# Patient Record
Sex: MALE | Race: WHITE | NOT HISPANIC OR LATINO | URBAN - METROPOLITAN AREA
[De-identification: names, ages, dates, MRNs, and addresses within clinical notes are randomized per-mention and may not be internally consistent; named-entity substitution may affect disease eponyms.]

---

## 2018-11-21 ENCOUNTER — OUTPATIENT (OUTPATIENT)
Dept: OUTPATIENT SERVICES | Facility: HOSPITAL | Age: 38
LOS: 1 days | End: 2018-11-21
Payer: COMMERCIAL

## 2018-11-21 VITALS
HEART RATE: 71 BPM | SYSTOLIC BLOOD PRESSURE: 125 MMHG | TEMPERATURE: 98 F | WEIGHT: 216.05 LBS | DIASTOLIC BLOOD PRESSURE: 85 MMHG | HEIGHT: 72 IN | RESPIRATION RATE: 16 BRPM | OXYGEN SATURATION: 97 %

## 2018-11-21 DIAGNOSIS — Z01.818 ENCOUNTER FOR OTHER PREPROCEDURAL EXAMINATION: ICD-10-CM

## 2018-11-21 DIAGNOSIS — M25.561 PAIN IN RIGHT KNEE: ICD-10-CM

## 2018-11-21 LAB
HCT VFR BLD CALC: 42.2 % — SIGNIFICANT CHANGE UP (ref 39–50)
HGB BLD-MCNC: 15.1 G/DL — SIGNIFICANT CHANGE UP (ref 13–17)
MCHC RBC-ENTMCNC: 30.7 PG — SIGNIFICANT CHANGE UP (ref 27–34)
MCHC RBC-ENTMCNC: 35.8 GM/DL — SIGNIFICANT CHANGE UP (ref 32–36)
MCV RBC AUTO: 85.8 FL — SIGNIFICANT CHANGE UP (ref 80–100)
NRBC # BLD: 0 /100 WBCS — SIGNIFICANT CHANGE UP (ref 0–0)
PLATELET # BLD AUTO: 245 K/UL — SIGNIFICANT CHANGE UP (ref 150–400)
RBC # BLD: 4.92 M/UL — SIGNIFICANT CHANGE UP (ref 4.2–5.8)
RBC # FLD: 12.3 % — SIGNIFICANT CHANGE UP (ref 10.3–14.5)
WBC # BLD: 4.96 K/UL — SIGNIFICANT CHANGE UP (ref 3.8–10.5)
WBC # FLD AUTO: 4.96 K/UL — SIGNIFICANT CHANGE UP (ref 3.8–10.5)

## 2018-11-21 PROCEDURE — 36415 COLL VENOUS BLD VENIPUNCTURE: CPT

## 2018-11-21 PROCEDURE — G0463: CPT

## 2018-11-21 PROCEDURE — 85027 COMPLETE CBC AUTOMATED: CPT

## 2018-11-21 NOTE — H&P PST ADULT - PMH
Chronic pain of right knee    Rupture of anterior cruciate ligament of right knee, initial encounter

## 2018-11-21 NOTE — H&P PST ADULT - PROBLEM SELECTOR PLAN 1
Right Knee Arthroscopy, Possible Anterior Cruciate Ligament Repair. Right Knee Arthroscopy, Possible Anterior Cruciate Ligament Repair.  Labs. Pre-op and Hibiclens instructions reviewed and given. Avoid NSAIDs and OTC supplements. Verbalized understanding.

## 2018-11-21 NOTE — H&P PST ADULT - NSANTHOSAYNRD_GEN_A_CORE
No. KEATON screening performed.  STOP BANG Legend: 0-2 = LOW Risk; 3-4 = INTERMEDIATE Risk; 5-8 = HIGH Risk

## 2018-11-21 NOTE — H&P PST ADULT - HISTORY OF PRESENT ILLNESS
37 y.o. male c/o Right knee pain for over two years. He denies any h/o trauma. An MRI of the Right knee revealed an ACL tear. He is scheduled for Right Knee Arthroscopy, Possible Anterior Cruciate Ligament Repair. 37 y.o. male c/o Right knee pain and buckling for over two years. He denies any h/o trauma. An MRI of the Right knee revealed partial ACL tear and bursitis. He is scheduled for Right Knee Arthroscopy, Possible Anterior Cruciate Ligament Repair.

## 2018-11-27 ENCOUNTER — TRANSCRIPTION ENCOUNTER (OUTPATIENT)
Age: 38
End: 2018-11-27

## 2018-11-28 ENCOUNTER — RESULT REVIEW (OUTPATIENT)
Age: 38
End: 2018-11-28

## 2018-11-28 ENCOUNTER — OUTPATIENT (OUTPATIENT)
Dept: OUTPATIENT SERVICES | Facility: HOSPITAL | Age: 38
LOS: 1 days | End: 2018-11-28
Payer: COMMERCIAL

## 2018-11-28 VITALS
RESPIRATION RATE: 15 BRPM | DIASTOLIC BLOOD PRESSURE: 66 MMHG | SYSTOLIC BLOOD PRESSURE: 108 MMHG | HEART RATE: 69 BPM | OXYGEN SATURATION: 96 %

## 2018-11-28 VITALS
SYSTOLIC BLOOD PRESSURE: 143 MMHG | OXYGEN SATURATION: 97 % | TEMPERATURE: 98 F | WEIGHT: 210.1 LBS | HEIGHT: 72 IN | DIASTOLIC BLOOD PRESSURE: 95 MMHG | RESPIRATION RATE: 16 BRPM | HEART RATE: 76 BPM

## 2018-11-28 DIAGNOSIS — M25.561 PAIN IN RIGHT KNEE: ICD-10-CM

## 2018-11-28 DIAGNOSIS — Z01.818 ENCOUNTER FOR OTHER PREPROCEDURAL EXAMINATION: ICD-10-CM

## 2018-11-28 PROCEDURE — 88304 TISSUE EXAM BY PATHOLOGIST: CPT

## 2018-11-28 PROCEDURE — 88304 TISSUE EXAM BY PATHOLOGIST: CPT | Mod: 26

## 2018-11-28 PROCEDURE — 29881 ARTHRS KNE SRG MNISECTMY M/L: CPT | Mod: RT

## 2018-11-28 RX ORDER — SODIUM CHLORIDE 9 MG/ML
1000 INJECTION, SOLUTION INTRAVENOUS
Qty: 0 | Refills: 0 | Status: DISCONTINUED | OUTPATIENT
Start: 2018-11-28 | End: 2018-11-28

## 2018-11-28 RX ORDER — METOCLOPRAMIDE HCL 10 MG
10 TABLET ORAL ONCE
Qty: 0 | Refills: 0 | Status: COMPLETED | OUTPATIENT
Start: 2018-11-28 | End: 2018-11-28

## 2018-11-28 RX ORDER — HYDROMORPHONE HYDROCHLORIDE 2 MG/ML
0.5 INJECTION INTRAMUSCULAR; INTRAVENOUS; SUBCUTANEOUS
Qty: 0 | Refills: 0 | Status: DISCONTINUED | OUTPATIENT
Start: 2018-11-28 | End: 2018-11-28

## 2018-11-28 RX ORDER — MORPHINE SULFATE 50 MG/1
4 CAPSULE, EXTENDED RELEASE ORAL ONCE
Qty: 0 | Refills: 0 | Status: DISCONTINUED | OUTPATIENT
Start: 2018-11-28 | End: 2018-11-28

## 2018-11-28 RX ORDER — CEFAZOLIN SODIUM 1 G
2000 VIAL (EA) INJECTION ONCE
Qty: 0 | Refills: 0 | Status: COMPLETED | OUTPATIENT
Start: 2018-11-28 | End: 2018-11-28

## 2018-11-28 RX ORDER — OXYCODONE HYDROCHLORIDE 5 MG/1
5 TABLET ORAL ONCE
Qty: 0 | Refills: 0 | Status: DISCONTINUED | OUTPATIENT
Start: 2018-11-28 | End: 2018-11-28

## 2018-11-28 RX ORDER — OXYCODONE HYDROCHLORIDE 5 MG/1
10 TABLET ORAL ONCE
Qty: 0 | Refills: 0 | Status: DISCONTINUED | OUTPATIENT
Start: 2018-11-28 | End: 2018-11-28

## 2018-11-28 RX ADMIN — Medication 10 MILLIGRAM(S): at 12:16

## 2018-11-28 RX ADMIN — SODIUM CHLORIDE 75 MILLILITER(S): 9 INJECTION, SOLUTION INTRAVENOUS at 08:49

## 2018-11-28 RX ADMIN — SODIUM CHLORIDE 100 MILLILITER(S): 9 INJECTION, SOLUTION INTRAVENOUS at 11:42

## 2018-11-28 NOTE — BRIEF OPERATIVE NOTE - PROCEDURE
<<-----Click on this checkbox to enter Procedure Arthroscopy of knee  11/28/2018    Active  [facility]

## 2018-11-28 NOTE — ASU DISCHARGE PLAN (ADULT/PEDIATRIC). - MEDICATION SUMMARY - MEDICATIONS TO TAKE
I will START or STAY ON the medications listed below when I get home from the hospital:    Percocet 5/325 oral tablet  -- 1 tab(s) by mouth every 6 hours, As Needed for pain  -- Indication: For for pain I will START or STAY ON the medications listed below when I get home from the hospital:    Percocet 5/325 oral tablet  -- 1 tab(s) by mouth every 4 hours x 7 days  -for severe pain MDD:6   -- Caution federal law prohibits the transfer of this drug to any person other  than the person for whom it was prescribed.  May cause drowsiness.  Alcohol may intensify this effect.  Use care when operating dangerous machinery.  This prescription cannot be refilled.  This product contains acetaminophen.  Do not use  with any other product containing acetaminophen to prevent possible liver damage.  Using more of this medication than prescribed may cause serious breathing problems.    -- Indication: For for pain

## 2018-11-28 NOTE — ASU PATIENT PROFILE, ADULT - NS SC CAGE ALCOHOL EYE OPENER
[facility] 63   OPERATIVE REPORT       Name:  Mitchell Ascencio   MR#:  [id number]   :  1972   Account #:  [de-identified]   Date of Adm:  2017       DATE OF SURGERY: 2017. SURGEON: Adam Brown MD    PREOPERATIVE DIAGNOSIS: Abdominal pain. Needs screening   colonoscopy. POSTOPERATIVE DIAGNOSIS: Abdominal pain. Needs screening   colonoscopy. Normal colonoscopy. INDICATIONS: This is a 39-year-old male who presented with   chronic right upper quadrant pain. He also presented with left   lower quadrant pain recently concerning for diverticulitis and   colonoscopy was requested. The patient understood the risks and   benefits and agreed to proceed. FINDINGS: This is a grossly normal colonoscopy. He does have   some liquidy stool with solid particles which could interfere   with mucosal detail. PROCEDURE: After informed consent was obtained, the patient was   brought into the GI suite. Conscious sedation with fentanyl and   Versed was used. The patient was placed in the left decubitus   position. Distal rectal exam was first performed, which was   normal. Then the regular colonoscope was used. This was advanced   all the way into the cecum. The ileocecal valve was clearly   identified. The colon was then carefully examined all the way   out. There was no evidence of polyps, tumor, or evidence of   bleeding. This is an essentially normal colonoscopy. The scope   was then withdrawn. The patient tolerated the procedure well and   was transferred to the recovery room in stable condition. RECOMMENDATION: The patient will continue with his abdominal   pain workup and will see me in the office.         Calvin Rivera MD      BY / Fransisco Armstrong   D:  2017   13:05   T:  2017   11:21   Job #:  [id number]
no

## 2018-11-28 NOTE — ASU DISCHARGE PLAN (ADULT/PEDIATRIC). - NOTIFY
Fever greater than 101/Numbness, color, or temperature change to extremity/Bleeding that does not stop Bleeding that does not stop/Numbness, tingling/Fever greater than 101/Numbness, color, or temperature change to extremity

## 2018-11-29 LAB — SURGICAL PATHOLOGY FINAL REPORT - CH: SIGNIFICANT CHANGE UP

## 2019-02-07 NOTE — H&P PST ADULT - FALLEN IN THE PAST
Scribe Attestation (For Scribes USE Only)... Scribe Attestation (For Scribes USE Only).../Attending Attestation (For Attendings USE Only)... no

## 2019-08-06 PROBLEM — M25.561 PAIN IN RIGHT KNEE: Chronic | Status: ACTIVE | Noted: 2018-11-21

## 2019-08-06 PROBLEM — S83.511A SPRAIN OF ANTERIOR CRUCIATE LIGAMENT OF RIGHT KNEE, INITIAL ENCOUNTER: Chronic | Status: ACTIVE | Noted: 2018-11-21

## 2019-08-12 ENCOUNTER — APPOINTMENT (OUTPATIENT)
Dept: INTERNAL MEDICINE | Facility: CLINIC | Age: 39
End: 2019-08-12
Payer: COMMERCIAL

## 2019-08-12 ENCOUNTER — NON-APPOINTMENT (OUTPATIENT)
Age: 39
End: 2019-08-12

## 2019-08-12 VITALS
RESPIRATION RATE: 16 BRPM | DIASTOLIC BLOOD PRESSURE: 90 MMHG | BODY MASS INDEX: 29.71 KG/M2 | SYSTOLIC BLOOD PRESSURE: 122 MMHG | HEIGHT: 72 IN | TEMPERATURE: 98 F | HEART RATE: 77 BPM | WEIGHT: 219.38 LBS | OXYGEN SATURATION: 98 %

## 2019-08-12 DIAGNOSIS — R42 DIZZINESS AND GIDDINESS: ICD-10-CM

## 2019-08-12 DIAGNOSIS — Z78.9 OTHER SPECIFIED HEALTH STATUS: ICD-10-CM

## 2019-08-12 DIAGNOSIS — Z00.00 ENCOUNTER FOR GENERAL ADULT MEDICAL EXAMINATION W/OUT ABNORMAL FINDINGS: ICD-10-CM

## 2019-08-12 DIAGNOSIS — Z80.8 FAMILY HISTORY OF MALIGNANT NEOPLASM OF OTHER ORGANS OR SYSTEMS: ICD-10-CM

## 2019-08-12 DIAGNOSIS — Z86.59 PERSONAL HISTORY OF OTHER MENTAL AND BEHAVIORAL DISORDERS: ICD-10-CM

## 2019-08-12 DIAGNOSIS — G43.909 MIGRAINE, UNSPECIFIED, NOT INTRACTABLE, W/OUT STATUS MIGRAINOSUS: ICD-10-CM

## 2019-08-12 PROCEDURE — 99386 PREV VISIT NEW AGE 40-64: CPT | Mod: 25

## 2019-08-12 PROCEDURE — 93000 ELECTROCARDIOGRAM COMPLETE: CPT

## 2019-08-12 PROCEDURE — 36415 COLL VENOUS BLD VENIPUNCTURE: CPT

## 2019-08-12 NOTE — ADDENDUM
[FreeTextEntry1] : I, Toño Crenshaw, acted solely as scribe for Dr. Alexei Sebastian DO on this date 08/12/2019  1:50PM .\par \par All medical record entries made by the Scribe were at my, Dr. Alexei Sebastian DO direction and personally dictated by me on 08/12/2019  1:50PM. I have reviewed the chart and agree that the record accurately reflects my personal performance of the history, physical exam, assessment and plan. I have also personally directed, reviewed and agreed with the chart.\par

## 2019-08-12 NOTE — ASSESSMENT
[FreeTextEntry1] : New patient is a 38 year old male with a past medical history as above who presents for annual physical exam.\par

## 2019-08-12 NOTE — HISTORY OF PRESENT ILLNESS
[FreeTextEntry1] : new patient annual physical exam\par  [de-identified] : New patient is a 38 year old male with a past medical history as below who presents for annual physical exam. Patient is not currently taking any prescription medications or vitamins/supplements. Patient notes history of depression and being on Zoloft in the past. He states the medication did not help. He is not currently interested in initiating treatment. Patient notes intermittent dizziness that started 1 week. He states he felt as though the room were spinning. Patient states the dizziness lasts 2-3 minutes and notes it does not worsen with changing head positions. He also denies multiple episodes of dizziness on the same day. Patient notes bilateral migraine headaches with some sensitivity to sound, but no sensitivity to light. He also notes associated nausea and vomiting. \par

## 2019-08-12 NOTE — HEALTH RISK ASSESSMENT
[1] : 1) Little interest or pleasure doing things for several days (1) [2] : 2) Feeling down, depressed, or hopeless for more than half of the days (2) [[id number]] : 3 [[id number]] : 13

## 2019-08-12 NOTE — REVIEW OF SYSTEMS
[Dizziness] : dizziness [Negative] : Heme/Lymph [Nausea] : nausea [Vomiting] : vomiting [Headache] : headache [Depression] : depression [de-identified] : migraines

## 2019-08-12 NOTE — PLAN
[FreeTextEntry1] : Psychiatry\par depression - discussed starting treatment, currently refused\par Neurology\par dizziness/vertigo - discussed starting Meclizine, patient to consider and follow up  \par migraine headache - Rx given for MRI of head w/wo contrast - discussed starting Maxalt pending MRI results \par \par check EKG, male panel, and UA

## 2019-08-12 NOTE — PHYSICAL EXAM
[No Acute Distress] : no acute distress [Well Nourished] : well nourished [Well Developed] : well developed [Well-Appearing] : well-appearing [Normal Sclera/Conjunctiva] : normal sclera/conjunctiva [PERRL] : pupils equal round and reactive to light [EOMI] : extraocular movements intact [Normal Outer Ear/Nose] : the outer ears and nose were normal in appearance [Normal Oropharynx] : the oropharynx was normal [No JVD] : no jugular venous distention [Supple] : supple [No Lymphadenopathy] : no lymphadenopathy [No Respiratory Distress] : no respiratory distress  [Thyroid Normal, No Nodules] : the thyroid was normal and there were no nodules present [No Accessory Muscle Use] : no accessory muscle use [Clear to Auscultation] : lungs were clear to auscultation bilaterally [Regular Rhythm] : with a regular rhythm [Normal Rate] : normal rate  [Normal S1, S2] : normal S1 and S2 [No Murmur] : no murmur heard [No Carotid Bruits] : no carotid bruits [No Abdominal Bruit] : a ~M bruit was not heard ~T in the abdomen [No Varicosities] : no varicosities [Pedal Pulses Present] : the pedal pulses are present [No Edema] : there was no peripheral edema [No Palpable Aorta] : no palpable aorta [No Extremity Clubbing/Cyanosis] : no extremity clubbing/cyanosis [Soft] : abdomen soft [Non Tender] : non-tender [Non-distended] : non-distended [No Masses] : no abdominal mass palpated [No HSM] : no HSM [Normal Bowel Sounds] : normal bowel sounds [Normal Posterior Cervical Nodes] : no posterior cervical lymphadenopathy [Normal Anterior Cervical Nodes] : no anterior cervical lymphadenopathy [No CVA Tenderness] : no CVA  tenderness [No Spinal Tenderness] : no spinal tenderness [No Joint Swelling] : no joint swelling [Grossly Normal Strength/Tone] : grossly normal strength/tone [No Rash] : no rash [Coordination Grossly Intact] : coordination grossly intact [No Focal Deficits] : no focal deficits [Normal Gait] : normal gait [Deep Tendon Reflexes (DTR)] : deep tendon reflexes were 2+ and symmetric [Normal Affect] : the affect was normal [Normal Insight/Judgement] : insight and judgment were intact [de-identified] : post-nasal drip  [de-identified] : overweight

## 2019-08-15 ENCOUNTER — CHART COPY (OUTPATIENT)
Age: 39
End: 2019-08-15

## 2019-08-15 LAB
25(OH)D3 SERPL-MCNC: 22.3 NG/ML
ALBUMIN SERPL ELPH-MCNC: 4.9 G/DL
ALP BLD-CCNC: 62 U/L
ALT SERPL-CCNC: 24 U/L
ANION GAP SERPL CALC-SCNC: 13 MMOL/L
APPEARANCE: CLEAR
AST SERPL-CCNC: 22 U/L
BASOPHILS # BLD AUTO: 0.03 K/UL
BASOPHILS NFR BLD AUTO: 0.5 %
BILIRUB SERPL-MCNC: 0.7 MG/DL
BILIRUBIN URINE: NEGATIVE
BLOOD URINE: NEGATIVE
BUN SERPL-MCNC: 15 MG/DL
CALCIUM SERPL-MCNC: 9.8 MG/DL
CHLORIDE SERPL-SCNC: 104 MMOL/L
CHOLEST SERPL-MCNC: 195 MG/DL
CHOLEST/HDLC SERPL: 3.4 RATIO
CO2 SERPL-SCNC: 25 MMOL/L
COLOR: YELLOW
CREAT SERPL-MCNC: 0.89 MG/DL
EOSINOPHIL # BLD AUTO: 0.1 K/UL
EOSINOPHIL NFR BLD AUTO: 1.5 %
ESTIMATED AVERAGE GLUCOSE: 91 MG/DL
GLUCOSE QUALITATIVE U: NEGATIVE
GLUCOSE SERPL-MCNC: 84 MG/DL
HBA1C MFR BLD HPLC: 4.8 %
HCT VFR BLD CALC: 45.6 %
HDLC SERPL-MCNC: 57 MG/DL
HGB BLD-MCNC: 15.4 G/DL
IMM GRANULOCYTES NFR BLD AUTO: 0.2 %
KETONES URINE: NEGATIVE
LDLC SERPL CALC-MCNC: 121 MG/DL
LEUKOCYTE ESTERASE URINE: NEGATIVE
LYMPHOCYTES # BLD AUTO: 2.37 K/UL
LYMPHOCYTES NFR BLD AUTO: 36.6 %
MAN DIFF?: NORMAL
MCHC RBC-ENTMCNC: 30.8 PG
MCHC RBC-ENTMCNC: 33.8 GM/DL
MCV RBC AUTO: 91.2 FL
MONOCYTES # BLD AUTO: 0.42 K/UL
MONOCYTES NFR BLD AUTO: 6.5 %
NEUTROPHILS # BLD AUTO: 3.54 K/UL
NEUTROPHILS NFR BLD AUTO: 54.7 %
NITRITE URINE: NEGATIVE
PH URINE: 7.5
PLATELET # BLD AUTO: 260 K/UL
POTASSIUM SERPL-SCNC: 4.5 MMOL/L
PROT SERPL-MCNC: 7.5 G/DL
PROTEIN URINE: NEGATIVE
PSA SERPL-MCNC: 0.71 NG/ML
RBC # BLD: 5 M/UL
RBC # FLD: 12.9 %
SODIUM SERPL-SCNC: 142 MMOL/L
SPECIFIC GRAVITY URINE: 1.03
TRIGL SERPL-MCNC: 85 MG/DL
TSH SERPL-ACNC: 1.06 UIU/ML
UROBILINOGEN URINE: NORMAL
WBC # FLD AUTO: 6.47 K/UL

## 2019-10-02 ENCOUNTER — FORM ENCOUNTER (OUTPATIENT)
Age: 39
End: 2019-10-02

## 2019-10-03 ENCOUNTER — OUTPATIENT (OUTPATIENT)
Dept: OUTPATIENT SERVICES | Facility: HOSPITAL | Age: 39
LOS: 1 days | End: 2019-10-03
Payer: COMMERCIAL

## 2019-10-03 ENCOUNTER — APPOINTMENT (OUTPATIENT)
Dept: MRI IMAGING | Facility: CLINIC | Age: 39
End: 2019-10-03
Payer: COMMERCIAL

## 2019-10-03 DIAGNOSIS — G43.909 MIGRAINE, UNSPECIFIED, NOT INTRACTABLE, WITHOUT STATUS MIGRAINOSUS: ICD-10-CM

## 2019-10-03 PROCEDURE — 70553 MRI BRAIN STEM W/O & W/DYE: CPT

## 2019-10-03 PROCEDURE — 70553 MRI BRAIN STEM W/O & W/DYE: CPT | Mod: 26

## 2019-10-03 PROCEDURE — A9585: CPT

## 2019-10-11 ENCOUNTER — APPOINTMENT (OUTPATIENT)
Dept: INTERNAL MEDICINE | Facility: CLINIC | Age: 39
End: 2019-10-11

## 2019-10-17 ENCOUNTER — APPOINTMENT (OUTPATIENT)
Dept: OTOLARYNGOLOGY | Facility: CLINIC | Age: 39
End: 2019-10-17
Payer: COMMERCIAL

## 2019-10-17 VITALS
WEIGHT: 210 LBS | DIASTOLIC BLOOD PRESSURE: 81 MMHG | HEIGHT: 72 IN | SYSTOLIC BLOOD PRESSURE: 142 MMHG | HEART RATE: 95 BPM | BODY MASS INDEX: 28.44 KG/M2

## 2019-10-17 DIAGNOSIS — Z72.89 OTHER PROBLEMS RELATED TO LIFESTYLE: ICD-10-CM

## 2019-10-17 DIAGNOSIS — Z83.3 FAMILY HISTORY OF DIABETES MELLITUS: ICD-10-CM

## 2019-10-17 DIAGNOSIS — G43.909 MIGRAINE, UNSPECIFIED, NOT INTRACTABLE, W/OUT STATUS MIGRAINOSUS: ICD-10-CM

## 2019-10-17 DIAGNOSIS — M26.02 MAXILLARY HYPOPLASIA: ICD-10-CM

## 2019-10-17 DIAGNOSIS — J34.1 CYST AND MUCOCELE OF NOSE AND NASAL SINUS: ICD-10-CM

## 2019-10-17 PROCEDURE — 31231 NASAL ENDOSCOPY DX: CPT

## 2019-10-17 PROCEDURE — 99243 OFF/OP CNSLTJ NEW/EST LOW 30: CPT | Mod: 25

## 2019-10-17 RX ORDER — AMOXICILLIN AND CLAVULANATE POTASSIUM 875; 125 MG/1; MG/1
875-125 TABLET, COATED ORAL
Qty: 20 | Refills: 0 | Status: ACTIVE | COMMUNITY
Start: 2019-10-17 | End: 1900-01-01

## 2019-10-17 RX ORDER — METHYLPREDNISOLONE 4 MG/1
4 TABLET ORAL
Qty: 1 | Refills: 0 | Status: ACTIVE | COMMUNITY
Start: 2019-10-17 | End: 1900-01-01

## 2019-10-17 NOTE — ASSESSMENT
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  Recommended start augmentin and medrol and return in about 3-4 weeks.  Will get CT of sinuses prior to return and have patient return to see Dr. Murrell - may need procedure.  Also advised patient that this may not be cause of headaches and his migraines may still need treatment despite this abnormal sinus finding.

## 2019-10-17 NOTE — HISTORY OF PRESENT ILLNESS
[de-identified] : Had MRI done about 2 weeks ago - hx of migraines but had MRI ordered due to headaches.  Had maxillary sinusitis noted on MRI and cyst in sinus.  Headaches occur about 3 every 2 mos.  Does have nausea with headaches.   No fever.  No problems breathing thru nose.  No treatment for sinuses in past.  No nasal discharge.

## 2019-10-17 NOTE — CONSULT LETTER
[Dear  ___] : Dear  [unfilled], [Consult Letter:] : I had the pleasure of evaluating your patient, [unfilled]. [Please see my note below.] : Please see my note below. [FreeTextEntry3] : Sincerely,\par \par Jaylan Vargas MD., FACS\par

## 2019-10-17 NOTE — PHYSICAL EXAM
[] : septum deviated to the left [Nasal Endoscopy Performed] : nasal endoscopy was performed, see procedure section for findings [Midline] : trachea located in midline position [Normal] : orientation to person, place, and time: normal

## 2019-10-17 NOTE — REVIEW OF SYSTEMS
[Seasonal Allergies] : seasonal allergies [Vertigo] : vertigo [Dizziness] : dizziness [Depression] : depression [Negative] : Heme/Lymph [FreeTextEntry1] : headache

## 2019-10-17 NOTE — DATA REVIEWED
[de-identified] : MRI reviewed with patient - hypoplastic maxillary sinus - left - and secretions with ? mucocoele - films done about 2 weeks ago.

## 2019-10-27 ENCOUNTER — FORM ENCOUNTER (OUTPATIENT)
Age: 39
End: 2019-10-27

## 2019-10-28 ENCOUNTER — APPOINTMENT (OUTPATIENT)
Dept: CT IMAGING | Facility: CLINIC | Age: 39
End: 2019-10-28
Payer: COMMERCIAL

## 2019-10-28 ENCOUNTER — OUTPATIENT (OUTPATIENT)
Dept: OUTPATIENT SERVICES | Facility: HOSPITAL | Age: 39
LOS: 1 days | End: 2019-10-28
Payer: COMMERCIAL

## 2019-10-28 DIAGNOSIS — J32.0 CHRONIC MAXILLARY SINUSITIS: ICD-10-CM

## 2019-10-28 PROCEDURE — 70486 CT MAXILLOFACIAL W/O DYE: CPT

## 2019-10-28 PROCEDURE — 70486 CT MAXILLOFACIAL W/O DYE: CPT | Mod: 26

## 2019-11-01 ENCOUNTER — APPOINTMENT (OUTPATIENT)
Dept: OTOLARYNGOLOGY | Facility: CLINIC | Age: 39
End: 2019-11-01
Payer: COMMERCIAL

## 2019-11-01 VITALS
WEIGHT: 210 LBS | BODY MASS INDEX: 28.44 KG/M2 | HEIGHT: 72 IN | SYSTOLIC BLOOD PRESSURE: 131 MMHG | HEART RATE: 97 BPM | DIASTOLIC BLOOD PRESSURE: 82 MMHG

## 2019-11-01 DIAGNOSIS — J32.0 CHRONIC MAXILLARY SINUSITIS: ICD-10-CM

## 2019-11-01 DIAGNOSIS — J34.2 DEVIATED NASAL SEPTUM: ICD-10-CM

## 2019-11-01 PROCEDURE — 99213 OFFICE O/P EST LOW 20 MIN: CPT | Mod: 25

## 2019-11-01 PROCEDURE — 31231 NASAL ENDOSCOPY DX: CPT

## 2019-11-01 NOTE — HISTORY OF PRESENT ILLNESS
[de-identified] : gets migrans - pounding severe, b/l, usually last 24 hours, + n/v, + light sensitivity \par + facial pressure - intermittent - years, b/l, maybe once a month short lived at temples\par no sinus infections\par no nasal congestion \par no runny nose \par got scan after got course of abx and steroids \par

## 2019-11-01 NOTE — CONSULT LETTER
[FreeTextEntry1] : Dear Dr. Vargas \par I had the pleasure of evaluating your patient SHARA HOBBS, thank you for allowing us to participate in their care. please see full note detailing our visit below.\par If you have any questions, please do not hesitate to call me and I would be happy to discuss further. \par \par Venkata Murrell M.D.\par Attending Physician,  \par Department of Otolaryngology - Head and Neck Surgery\par [facility] \par Office: [phone]\par Fax: [phone]\par \par

## 2019-11-01 NOTE — ASSESSMENT
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 unclear if triggering migraines, but does sounds like it is clearly a migraine, discussed option of dilation of the sinus with pt - risks and benefits, he would like to hold off, will monitor \par flonase\par NSS\par monitor\par \par \par

## 2019-11-01 NOTE — PROCEDURE
[FreeTextEntry6] : Procedure performed: Nasal Endoscopy- Diagnostic\par Pre-op indication(s): nasal congestion\par Post-op indication(s): nasal congestion \par Verbal and/or written consent obtained from patient\par Anterior rhinoscopy insufficient to account for symptoms\par Scope #: 3,  flexible fiber optic telescope \par The scope was introduced in the nasal passage between the middle and inferior turbinates to exam the inferior portion of the middle meatus and the fontanelle, as well as the maxillary ostia.  Next, the scope was passed medically and posteriorly to the middle turbinates to examine the sphenoethmoid recess and the superior turbinate region.\par Upon visualization the finders are as follows:\par Nasal Septum: left septal deviation\par Bilateral - Mucosa: boggy turbinates, Mucous: scant, Polyp: not seen, Inferior Turbinate: boggy, Middle Turbinate: normal, Superior Turbinate: normal, Inferior Meatus: narrow, Middle Meatus: narrow, Super Meatus:normal, Sphenoethmoidal Recess: clear\par

## 2019-11-24 ENCOUNTER — RX RENEWAL (OUTPATIENT)
Age: 39
End: 2019-11-24

## 2019-11-24 RX ORDER — RIZATRIPTAN BENZOATE 5 MG/1
5 TABLET, ORALLY DISINTEGRATING ORAL
Qty: 30 | Refills: 0 | Status: ACTIVE | COMMUNITY
Start: 2019-11-24 | End: 1900-01-01

## 2020-03-06 ENCOUNTER — APPOINTMENT (OUTPATIENT)
Dept: OTOLARYNGOLOGY | Facility: CLINIC | Age: 40
End: 2020-03-06

## 2020-03-22 ENCOUNTER — TRANSCRIPTION ENCOUNTER (OUTPATIENT)
Age: 40
End: 2020-03-22

## 2024-02-23 ENCOUNTER — OFFICE (OUTPATIENT)
Dept: URBAN - METROPOLITAN AREA CLINIC 109 | Facility: CLINIC | Age: 44
Setting detail: OPHTHALMOLOGY
End: 2024-02-23
Payer: COMMERCIAL

## 2024-02-23 DIAGNOSIS — T15.01XA: ICD-10-CM

## 2024-02-23 PROCEDURE — 65222 REMOVE FOREIGN BODY FROM EYE: CPT | Mod: RT | Performed by: OPHTHALMOLOGY

## 2024-02-23 ASSESSMENT — CONFRONTATIONAL VISUAL FIELD TEST (CVF)
OS_FINDINGS: FULL
OD_FINDINGS: FULL

## 2024-02-23 ASSESSMENT — LID EXAM ASSESSMENTS: OD_COMMENTS: NO FB WITH LID EVERTED

## 2024-02-23 ASSESSMENT — CORNEAL TRAUMA - FOREIGN BODY: OD_FOREIGNBODY: PRESENT

## 2024-02-24 ENCOUNTER — OFFICE (OUTPATIENT)
Dept: URBAN - METROPOLITAN AREA CLINIC 27 | Facility: CLINIC | Age: 44
Setting detail: OPHTHALMOLOGY
End: 2024-02-24
Payer: COMMERCIAL

## 2024-02-24 DIAGNOSIS — T15.01XA: ICD-10-CM

## 2024-02-24 PROCEDURE — 92012 INTRM OPH EXAM EST PATIENT: CPT | Performed by: OPTOMETRIST

## 2024-02-24 ASSESSMENT — LID EXAM ASSESSMENTS: OD_COMMENTS: NO FB WITH LID EVERTED

## 2024-02-27 ASSESSMENT — CORNEAL EDEMA CLINICAL DESCRIPTION: OD_CORNEALEDEMA: 1+

## 2024-02-27 ASSESSMENT — SPHEQUIV_DERIVED
OD_SPHEQUIV: 0
OS_SPHEQUIV: 0.125

## 2024-02-27 ASSESSMENT — REFRACTION_AUTOREFRACTION
OD_SPHERE: -0.25
OS_AXIS: 111
OD_AXIS: 024
OS_SPHERE: -0.50
OD_CYLINDER: +0.50
OS_CYLINDER: +1.25

## 2024-02-27 ASSESSMENT — CORNEAL TRAUMA - FOREIGN BODY: OD_FOREIGNBODY: ABSENT

## 2024-02-29 ENCOUNTER — OFFICE (OUTPATIENT)
Dept: URBAN - METROPOLITAN AREA CLINIC 27 | Facility: CLINIC | Age: 44
Setting detail: OPHTHALMOLOGY
End: 2024-02-29
Payer: COMMERCIAL

## 2024-02-29 DIAGNOSIS — T15.01XD: ICD-10-CM

## 2024-02-29 PROCEDURE — 99212 OFFICE O/P EST SF 10 MIN: CPT | Performed by: OPHTHALMOLOGY

## 2024-02-29 ASSESSMENT — LID EXAM ASSESSMENTS: OD_COMMENTS: NO FB WITH LID EVERTED

## 2024-03-13 ENCOUNTER — OFFICE (OUTPATIENT)
Dept: URBAN - METROPOLITAN AREA CLINIC 109 | Facility: CLINIC | Age: 44
Setting detail: OPHTHALMOLOGY
End: 2024-03-13
Payer: COMMERCIAL

## 2024-03-13 DIAGNOSIS — T15.01XA: ICD-10-CM

## 2024-03-13 PROCEDURE — 65222 REMOVE FOREIGN BODY FROM EYE: CPT | Mod: RT | Performed by: OPHTHALMOLOGY

## 2024-03-13 ASSESSMENT — SPHEQUIV_DERIVED
OS_SPHEQUIV: -0.125
OD_SPHEQUIV: -0.75

## 2024-03-13 ASSESSMENT — REFRACTION_MANIFEST
OD_VA1: 20/20
OS_VA1: 20/20
OD_SPHERE: -0.50
OD_AXIS: 60
OD_CYLINDER: -0.50
OS_SPHERE: +0.50
OS_CYLINDER: -1.25
OS_AXIS: 15

## 2024-03-13 ASSESSMENT — LID EXAM ASSESSMENTS: OD_COMMENTS: NO FB WITH LID EVERTED

## 2024-03-14 ENCOUNTER — OFFICE (OUTPATIENT)
Dept: URBAN - METROPOLITAN AREA CLINIC 109 | Facility: CLINIC | Age: 44
Setting detail: OPHTHALMOLOGY
End: 2024-03-14
Payer: COMMERCIAL

## 2024-03-14 DIAGNOSIS — T15.01XD: ICD-10-CM

## 2024-03-14 PROCEDURE — 99213 OFFICE O/P EST LOW 20 MIN: CPT | Performed by: OPHTHALMOLOGY

## 2024-03-14 ASSESSMENT — REFRACTION_MANIFEST
OD_CYLINDER: -0.50
OS_SPHERE: +0.50
OD_AXIS: 60
OD_VA1: 20/20-
OS_AXIS: 15
OS_CYLINDER: -1.25
OS_VA1: 20/20
OD_SPHERE: -0.50

## 2024-03-14 ASSESSMENT — SPHEQUIV_DERIVED
OD_SPHEQUIV: -0.75
OS_SPHEQUIV: -0.125

## 2024-03-14 ASSESSMENT — LID EXAM ASSESSMENTS: OD_COMMENTS: NO FB WITH LID EVERTED

## 2024-03-21 ENCOUNTER — OFFICE (OUTPATIENT)
Dept: URBAN - METROPOLITAN AREA CLINIC 109 | Facility: CLINIC | Age: 44
Setting detail: OPHTHALMOLOGY
End: 2024-03-21
Payer: COMMERCIAL

## 2024-03-21 DIAGNOSIS — H02.831: ICD-10-CM

## 2024-03-21 DIAGNOSIS — T15.01XD: ICD-10-CM

## 2024-03-21 DIAGNOSIS — H02.835: ICD-10-CM

## 2024-03-21 DIAGNOSIS — H02.834: ICD-10-CM

## 2024-03-21 DIAGNOSIS — H02.832: ICD-10-CM

## 2024-03-21 PROCEDURE — 99213 OFFICE O/P EST LOW 20 MIN: CPT | Performed by: OPHTHALMOLOGY

## 2024-03-21 ASSESSMENT — REFRACTION_MANIFEST
OS_SPHERE: +0.50
OS_CYLINDER: -1.25
OD_CYLINDER: -0.50
OD_AXIS: 60
OD_SPHERE: -0.50
OS_VA1: 20/20
OS_AXIS: 15
OD_VA1: 20/20-

## 2024-03-21 ASSESSMENT — LID EXAM ASSESSMENTS: OD_COMMENTS: NO FB WITH LID EVERTED

## 2024-03-21 ASSESSMENT — SPHEQUIV_DERIVED
OS_SPHEQUIV: -0.125
OD_SPHEQUIV: -0.75

## 2024-03-21 ASSESSMENT — LID POSITION - DERMATOCHALASIS
OS_DERMATOCHALASIS: LLL LUL T
OD_DERMATOCHALASIS: RLL RUL T

## 2024-12-25 PROBLEM — F10.90 ALCOHOL USE: Status: ACTIVE | Noted: 2019-10-17

## 2025-03-24 NOTE — ASU PREOP CHECKLIST - BP NONINVASIVE DIASTOLIC (MM HG)
[facility]  Outpatient Telehealth Progress Note   Patient Status:  Established  Name:  Jens Rogel  :  2002  Date of Service: 3/24/2025  Time In: 359  Time Out: 455     HIPAA: You have chosen to receive care through a video visit today. This provider is located at home address in connection with the [facility] (through [facility]), [address] [place] [address] The Patient is seen remotely via telehealth at home ([address] [place] [address] [place] [address]) using [employer] platforms and is in a secure environment for this session. The patient's condition being diagnosed/treated is appropriate for telemedicine. The provider identified herself and credentials LCSW, BHUPINDERDC.  The patient and/or guardian consent(s) to be seen remotely, and when consent is given, Jens understand(s) consent allows for patient identifiable information to be sent to a third party as needed. Jens can refuse to be seen remotely at any time. The electronic data is encrypted and password protected, and the patient has been advised of the potential risks to privacy, not withstanding such measures.  The patient has signed the video visit consent form.  The visit included audio and video interaction. No technical issues occurred during this visit.     Verified the patients identify using Name and date of birth. Patient states there are no changes the their insurance.     IDENTIFYING INFORMATION:  The patient is a 22 y.o. male who presents for  an outpatient follow-up appointment.      I, Jens FER Pebbles, hereby acknowledge that I have the right to discuss the assessment, potential risks, and benefits of any recommended treatment.    Chief Complaint: Patient reports problems with a Bipolar Mood Disorder.     Session Goal:  Patient with explore and process thoughts/feelings/coping skills to prevent deterioration of mood and need for a higher level of care.    Subjective    HPI:  Patient arrived for session on time, clean and casually dressed without evidence of intoxication, withdrawal, or perceptual disturbance. Patient arrived as: age appropriate.  Patient indicates Jens is an open and willing participant in today's session.  He reports a significant improvement in his work-related stress, attributing this to a recent 3-day trip to [place], which provided a much-needed respite. He has been actively engaging in self-care practices, such as locking himself in a room during his therapy sessions to avoid distractions from his cats. He expresses satisfaction with his job performance, citing positive feedback from his superiors and customers. He has successfully reduced peak times at his workplace by more than half within an 8-week period. He also mentions that his  and a representative from  have acknowledged his efforts in improving the store's atmosphere. Despite these achievements, he continues to struggle with feelings of fatigue and sleepiness, often feeling as though he could sleep for extended periods. He is uncertain if this is due to the changing weather or his allergies, which have been causing him discomfort. He experiences intermittent bursts of energy throughout the day, but these are not consistent. He recalls an incident where he felt fatigued all day but was unable to sleep upon returning home, leading him to drive back from [place] at 9:00 PM. He has not had his vitamin D levels checked and does not take a multivitamin. He recently got a new tattoo of a snake on his thigh and plans to get another one. He is considering taking a statistics class over the summer and is contemplating a future as a  .   FAMILY HISTORY  His grandmother fell last week and broke her arm and jaw, requiring reconstructive surgery on her arm.     Substance Use: no current use  Recent labs:    Last Urine Toxicity           No data to display               Objective    Medical History: Areas Reviewed: The following portions of the patient's history were reviewed and updated as appropriate: allergies, current medications, past family history, past medical history, past social history, past surgical history, and problem list.    Vitals:  Weight:  No Weight Documented This Encounter  Height: No Height Documented This Encounter  BMI:  There is no height or weight on file to calculate BMI.  Education:  The benefits of a healthy diet and exercise were discussed with patient, especially the positive effects they have on mental health. Patient encouraged to consider lifestyle modification regarding  diet and exercise patterns to maximize results of mental health treatment.    Medication Review:    Current Outpatient Medications:     ARIPiprazole (Abilify) 5 MG tablet, Take 1 tablet by mouth Daily., Disp: 30 tablet, Rfl: 2    lamoTRIgine (LaMICtal) 200 MG tablet, Take 1 tablet by mouth Daily., Disp: 30 tablet, Rfl: 2    QUEtiapine (SEROquel) 50 MG tablet, Take one half to one tablet nightly, Disp: 30 tablet, Rfl: 2     Medication Compliance:  compliance with medication regimen;   Assessment & Plan    Trauma Assessment:  Patient denies having been hit, slapped, kicked or otherwise physically hurt by others since last visit as well as having been forced to engage in any unwanted sexual acts since last visit.       Risk Assessment:  Patient adamantly and convincingly denies having SI/HI with or without intent, plans, or means. Patient denies having Hallucinations/Illusions and Delusions.  Patient  denies self-harming behaviors      Risk Level: History obtained from: patient and chart review.  Due to historical context and reported clinical markers, it appears patient meets criteria for LOW RISK to engage in self-harm or harm to others.  It is recommended Lattie be evaluated and assessed each contact for intent, plan, means and/or lethality each contact.      Behavior Health  Review Of Systems:  Psychiatric/Behavioral: Negative for agitation, behavioral problems, decreased concentration, dysphoric mood, hallucinations, self-injury, sleep disturbance, suicidal ideas, negative for hyperactivity. Positive for stress. The patient is nervous/anxious.    Pertinent items are noted in HPI.    MENTAL STATUS EXAM   General Appearance:  Cleanly groomed and dressed  Eye Contact:  Good eye contact  Attitude:  Cooperative  Motor Activity:  Other  Other Comment:  Video visit  Speech:  Normal rate, tone, volume  Language:  Spontaneous  Mood and affect:  Normal, pleasant  Hopelessness:  Denies  Loneliness: Denies  Thought Process:  Logical, goal-directed and linear  Associations/ Thought Content:  No delusions  Hallucinations:  None  Suicidal Ideations:  Not present  Homicidal Ideation:  Not present  Sensorium:  Alert  Orientation:  Person, place, time and situation  Immediate Recall, Recent, and Remote Memory:  Intact  Attention Span/ Concentration:  Good  Insight:  Good  Judgement:  Good  Reliability:  Good  Impulse Control:  Good       Treatment plan status:  Active   Treatment plan progress: Progressing  Prognosis: Fair with Ongoing Treatment   Functional Status: Mild impairment   Disposition:   Patient does not appear to be malingering.     Session Summary: Therapist met individually with patient this date. Discussed progress in treatment and any needs/concerns. Encouraged the patient to discuss/vent their feelings, frustrations, and fears concerning their ongoing issues and validated their feelings.    Assisted patient in processing above session content; acknowledged and normalized patient’s thoughts, feelings, and concerns.  Assisting the patient to identify  automatic negative thoughts, minimizing the positive then overgeneralization. Therapist applied CBT/REBT, Exploration of Coping Skills, Mindfulness Training, and Positive Coping Skills and encouraged the patient to use positive coping skills  such as Reframe the way you are thinking about the problem, Establish healthy boundaries , Use positive self-talk, Keep a positive attitude, and Utilize resources/coping skills.    Allowed patient to freely discuss issues without interruption or judgment. Provided safe, confidential environment to facilitate the development of positive therapeutic relationship and encourage open, honest communication.     Visit Diagnosis/Plan    ICD-10-CM ICD-9-CM   1. Bipolar II disorder  F31.81 296.89     Clinical Maneuvering:  All treatment options available at [facility] explored and documented.  The benefits of a healthy diet and exercise were discussed with patient, especially the positive effects they have on mental health. Patient encouraged to consider lifestyle modification regarding  diet and exercise patterns to maximize results of mental health treatment.  Reviewed previous available documentation  Reviewed most recent available labs     Assessment & Plan  1. Bi-polar disorder.  She reports feeling better about work but continues to experience significant fatigue, potentially related to changes in weather or allergies. He is advised to follow up on this issue, including checking vitamin D levels if appropriate with PCP.  Encouraged to continue self-care practices and to challenge negative thoughts by focusing on positive feedback and small wins.    Justification for therapy: Patient continues to struggle with a chronic/pervasive mental illness which continues to cause impairment in at least two important areas of functioning.  Patient appear(s) to maintain relative stability as compared to the  baseline measure.  Patient can reasonably be expected to continue to benefit from treatment and would likely be at increased risk for decompensation if treatment were stopped.  Patient endorses a positive benefit from therapy and appears to meet outpatient level of care. Patient expresses gratitude and  reports Jens had a positive experience today.    Recommended Referrals: Psychiatrist/APRN and Medical Provider (PCP)    Follow Up:  Return in about 3-4 weeks, or earlier if symptoms worsen or fail to improve for next follow up visit. [phone]      The patient understands that treatment is conditional on adhering to all Behavioral Health Outpatient Policy and Procedures.  The patient understands that providers/clinic has discretion to dismissed them from care if these are breached and a recommendation for further care will be made at time of discharge.    Future Appointments         Provider Department Center    4/28/2025 4:00 PM Martina Boogie LCSW [facility]    5/13/2025 4:00 PM Martina Boogie LCSW [facility]    5/27/2025 9:00 AM Negrita Mccabe APRN [facility]     5/28/2025 4:00 PM Martina Boogie LCSW [facility]    6/10/2025 4:00 PM Martina Boogie LCSW [facility]            This document electronically signed by Martina Osei LCSW, [facility] March 24, 2025 16:14 EDT    Patient or patient representative verbalized consent for the use of Ambient Listening during the visit with  Martina Osei LCSW for chart documentation. 3/24/2025  16:15 EDT    At [facility], we believe that sharing information builds trust and better relationships. You are receiving this note because you are receiving care at [facility] or have recently visited. It is possible you will see health information before a provider has talked with you about it. This kind of information can be easy to misunderstand as it is a medical document. It is intended as [id number] communication. It is written in medical language and may contain abbreviations or verbiage that are  unfamiliar. It may appear blunt or direct. Medical documents are intended to carry relevant information, facts as evident, and the clinical opinion of the practitioner.  To help you fully understand what it means for your health, we urge you to discuss this note with your provider   95